# Patient Record
Sex: MALE | Race: WHITE | NOT HISPANIC OR LATINO | Employment: FULL TIME | URBAN - METROPOLITAN AREA
[De-identification: names, ages, dates, MRNs, and addresses within clinical notes are randomized per-mention and may not be internally consistent; named-entity substitution may affect disease eponyms.]

---

## 2024-05-01 ENCOUNTER — OFFICE VISIT (OUTPATIENT)
Dept: FAMILY MEDICINE CLINIC | Facility: CLINIC | Age: 29
End: 2024-05-01
Payer: COMMERCIAL

## 2024-05-01 VITALS
HEIGHT: 72 IN | SYSTOLIC BLOOD PRESSURE: 142 MMHG | WEIGHT: 207 LBS | BODY MASS INDEX: 28.04 KG/M2 | RESPIRATION RATE: 18 BRPM | HEART RATE: 78 BPM | OXYGEN SATURATION: 99 % | DIASTOLIC BLOOD PRESSURE: 90 MMHG

## 2024-05-01 DIAGNOSIS — E10.65 TYPE 1 DIABETES MELLITUS WITH HYPERGLYCEMIA: Primary | ICD-10-CM

## 2024-05-01 DIAGNOSIS — E66.3 OVERWEIGHT WITH BODY MASS INDEX (BMI) OF 28 TO 28.9 IN ADULT: ICD-10-CM

## 2024-05-01 PROCEDURE — 99204 OFFICE O/P NEW MOD 45 MIN: CPT | Performed by: NURSE PRACTITIONER

## 2024-05-01 RX ORDER — LISINOPRIL 10 MG/1
10 TABLET ORAL DAILY
Qty: 90 TABLET | Refills: 3 | Status: SHIPPED | OUTPATIENT
Start: 2024-05-01

## 2024-05-01 NOTE — PROGRESS NOTES
Subjective   Anam Lehman is a 28 y.o. male.     History of Present Illness   New patient here to establish primary care   in [place]    Chronic Type 1 diabetes x  16+ years.   Meds: Novolog 18 units w meals, novolog 40 units nightly. He last saw endo 2-3 years ago. He does not check Bloodsugar. He has not liked the continuous glucose monitor. No meter at home.   Diet is somewhat healthy, 2 meals.day, coffee in AM only, a lot of meals out. Drinks alcohol 8-10 drinks/week, he drinks mostly water, some coke zero. He is lifting weight regularly. Weight is stable around 200 lbs.   Last A1c unknown.   No Neuropathy. Does not inspect feet, no history of wounds.   No recent Eye exam, never foot exam  ACE Lisinopril 10 mg qd. No side effects. BP elevated today 142/90, did not take lisinopril last night. No chest pain, palpitations, vision changes, + headache with elevated BP.   No Statin.    The following portions of the patient's history were reviewed and updated as appropriate: allergies, current medications, past family history, past medical history, past social history, past surgical history and problem list.    Review of Systems   Constitutional:  Negative for activity change, appetite change and unexpected weight loss.   Eyes:  Negative for visual disturbance.   Respiratory:  Negative for cough, shortness of breath and wheezing.    Cardiovascular:  Negative for chest pain, palpitations and leg swelling.   Endocrine: Negative for cold intolerance, heat intolerance, polydipsia, polyphagia and polyuria.   Genitourinary:  Negative for urinary incontinence.   Neurological:  Positive for headache. Negative for weakness.   Psychiatric/Behavioral:  Negative for sleep disturbance and depressed mood. The patient is not nervous/anxious.          Current Outpatient Medications:     insulin aspart (novoLOG) 100 UNIT/ML injection, Inject 18 Units under the skin into the appropriate area as directed 3 (Three) Times a  Day Before Meals., Disp: , Rfl:     insulin glargine (LANTUS) 100 UNIT/ML injection, Inject 40 Units under the skin into the appropriate area as directed Daily., Disp: , Rfl:     lisinopril (PRINIVIL,ZESTRIL) 10 MG tablet, Take 1 tablet by mouth Daily., Disp: 90 tablet, Rfl: 3    Objective   Physical Exam  Vitals reviewed.   Constitutional:       Appearance: Normal appearance.   HENT:      Nose: Nose normal.      Mouth/Throat:      Mouth: Mucous membranes are moist.   Cardiovascular:      Rate and Rhythm: Normal rate and regular rhythm.      Pulses: Normal pulses.      Heart sounds: Normal heart sounds.   Pulmonary:      Effort: Pulmonary effort is normal.      Breath sounds: Normal breath sounds.   Abdominal:      General: Bowel sounds are normal.   Skin:     General: Skin is warm.   Neurological:      General: No focal deficit present.      Mental Status: He is alert.         Vitals:    05/01/24 1058   BP: 142/90   Pulse: 78   Resp: 18   SpO2: 99%     Body mass index is 28.07 kg/m².    Procedures    TSH   Date Value Ref Range Status   05/23/2019 1.183 0.350 - 3.600 mcunit/mL Final     Comment:     Note: Pediatric Reference Ranges based on In-house studies and [employer] database [email or web address]    Pregnant Females:  1st Trimester: 0.02-2.81 mcU/mL  2nd Trimester: 0.19-2.90 mcU/mL  3rd Trimester: 0.30-3.00 mcU/mL     Hemoglobin A1C   Date Value Ref Range Status   10/31/2020 7.40 (H) 4.80 - 5.60 % Final       Assessment & Plan   Problems Addressed this Visit       Diabetes mellitus type 1 - Primary    Relevant Orders    Comprehensive Metabolic Panel    CBC & Differential    Hepatitis C Antibody    Lipid Panel With / Chol / HDL Ratio    Hemoglobin A1c    Microalbumin / Creatinine Urine Ratio - Urine, Clean Catch    TSH    T4, Free    Ambulatory Referral to Endocrinology    Overweight with body mass index (BMI) of 28 to 28.9 in adult     Patient's (Body mass index is 28.07 kg/m².) indicates that they are overweight  with health conditions that include hypertension and diabetes mellitus . Weight is newly identified. BMI is is above average; BMI management plan is completed. We discussed portion control, increasing exercise, joining a fitness center or start home based exercise program, and an leonides-based approach such as [employer] Pal or Lose It.           Diagnoses         Codes Comments    Type 1 diabetes mellitus with hyperglycemia    -  Primary ICD-10-CM: E10.65  ICD-9-CM: 250.01     Overweight with body mass index (BMI) of 28 to 28.9 in adult     ICD-10-CM: E66.3, Z68.28  ICD-9-CM: 278.02, V85.24           Orders Placed This Encounter   Procedures    Comprehensive Metabolic Panel     Order Specific Question:   Release to patient     Answer:   Routine Release [[id number]]    Hepatitis C Antibody     Order Specific Question:   Release to patient     Answer:   Routine Release [[id number]]    Lipid Panel With / Chol / HDL Ratio     Order Specific Question:   Release to patient     Answer:   Routine Release [[id number]]    Hemoglobin A1c     Order Specific Question:   Release to patient     Answer:   Routine Release [[id number]]    Microalbumin / Creatinine Urine Ratio - Urine, Clean Catch     Order Specific Question:   Release to patient     Answer:   Routine Release [[id number]]    TSH     Order Specific Question:   Release to patient     Answer:   Routine Release [[id number]]    T4, Free     Order Specific Question:   Release to patient     Answer:   Routine Release [[id number]]    Ambulatory Referral to Endocrinology     Referral Priority:   Routine     Referral Type:   Consultation     Referral Reason:   Specialty Services Required     Requested Specialty:   Endocrinology     Number of Visits Requested:   1    CBC & Differential     Order Specific Question:   Release to patient     Answer:   Routine Release [[id number]]       Type 1 diabetes- refer endo, declines glucometer, has family friend who has been prescribing insulin,  encouraged diabetic diet and limit alcohol             Education provided in AVS   Return in about 6 months (around 11/1/2024) for Annual physical.

## 2024-05-02 LAB
ALBUMIN SERPL-MCNC: 4.6 G/DL (ref 4.3–5.2)
ALBUMIN/GLOB SERPL: 1.6 {RATIO} (ref 1.2–2.2)
ALP SERPL-CCNC: 52 IU/L (ref 44–121)
ALT SERPL-CCNC: 10 IU/L (ref 0–44)
AST SERPL-CCNC: 17 IU/L (ref 0–40)
BASOPHILS # BLD AUTO: 0 X10E3/UL (ref 0–0.2)
BASOPHILS NFR BLD AUTO: 1 %
BILIRUB SERPL-MCNC: 0.6 MG/DL (ref 0–1.2)
BUN SERPL-MCNC: 16 MG/DL (ref 6–20)
BUN/CREAT SERPL: 16 (ref 9–20)
CALCIUM SERPL-MCNC: 9.9 MG/DL (ref 8.7–10.2)
CHLORIDE SERPL-SCNC: 100 MMOL/L (ref 96–106)
CHOLEST SERPL-MCNC: 157 MG/DL (ref 100–199)
CHOLEST/HDLC SERPL: 2.7 RATIO (ref 0–5)
CO2 SERPL-SCNC: 25 MMOL/L (ref 20–29)
CREAT SERPL-MCNC: 0.98 MG/DL (ref 0.76–1.27)
EGFRCR SERPLBLD CKD-EPI 2021: 109 ML/MIN/1.73
EOSINOPHIL # BLD AUTO: 0.1 X10E3/UL (ref 0–0.4)
EOSINOPHIL NFR BLD AUTO: 1 %
ERYTHROCYTE [DISTWIDTH] IN BLOOD BY AUTOMATED COUNT: 12.1 % (ref 11.6–15.4)
GLOBULIN SER CALC-MCNC: 2.8 G/DL (ref 1.5–4.5)
GLUCOSE SERPL-MCNC: 223 MG/DL (ref 70–99)
HBA1C MFR BLD: 7.4 % (ref 4.8–5.6)
HCT VFR BLD AUTO: 48.9 % (ref 37.5–51)
HCV IGG SERPL QL IA: NON REACTIVE
HDLC SERPL-MCNC: 59 MG/DL
HGB BLD-MCNC: 16.4 G/DL (ref 13–17.7)
IMM GRANULOCYTES # BLD AUTO: 0 X10E3/UL (ref 0–0.1)
IMM GRANULOCYTES NFR BLD AUTO: 0 %
LDLC SERPL CALC-MCNC: 87 MG/DL (ref 0–99)
LYMPHOCYTES # BLD AUTO: 1.1 X10E3/UL (ref 0.7–3.1)
LYMPHOCYTES NFR BLD AUTO: 25 %
MCH RBC QN AUTO: 30.5 PG (ref 26.6–33)
MCHC RBC AUTO-ENTMCNC: 33.5 G/DL (ref 31.5–35.7)
MCV RBC AUTO: 91 FL (ref 79–97)
MONOCYTES # BLD AUTO: 0.4 X10E3/UL (ref 0.1–0.9)
MONOCYTES NFR BLD AUTO: 8 %
NEUTROPHILS # BLD AUTO: 2.8 X10E3/UL (ref 1.4–7)
NEUTROPHILS NFR BLD AUTO: 65 %
PLATELET # BLD AUTO: 298 X10E3/UL (ref 150–450)
POTASSIUM SERPL-SCNC: 4.7 MMOL/L (ref 3.5–5.2)
PROT SERPL-MCNC: 7.4 G/DL (ref 6–8.5)
RBC # BLD AUTO: 5.38 X10E6/UL (ref 4.14–5.8)
SODIUM SERPL-SCNC: 140 MMOL/L (ref 134–144)
T4 FREE SERPL-MCNC: 1.31 NG/DL (ref 0.82–1.77)
TRIGL SERPL-MCNC: 54 MG/DL (ref 0–149)
TSH SERPL DL<=0.005 MIU/L-ACNC: 1.27 UIU/ML (ref 0.45–4.5)
VLDLC SERPL CALC-MCNC: 11 MG/DL (ref 5–40)
WBC # BLD AUTO: 4.4 X10E3/UL (ref 3.4–10.8)

## 2024-05-14 ENCOUNTER — OFFICE VISIT (OUTPATIENT)
Dept: ENDOCRINOLOGY | Age: 29
End: 2024-05-14
Payer: COMMERCIAL

## 2024-05-14 VITALS
SYSTOLIC BLOOD PRESSURE: 118 MMHG | HEART RATE: 81 BPM | OXYGEN SATURATION: 97 % | WEIGHT: 209 LBS | BODY MASS INDEX: 28.31 KG/M2 | DIASTOLIC BLOOD PRESSURE: 72 MMHG | HEIGHT: 72 IN

## 2024-05-14 DIAGNOSIS — R94.6 ABNORMAL THYROID EXAM: ICD-10-CM

## 2024-05-14 DIAGNOSIS — E10.65 TYPE 1 DIABETES MELLITUS WITH HYPERGLYCEMIA: Primary | ICD-10-CM

## 2024-05-14 PROCEDURE — 99244 OFF/OP CNSLTJ NEW/EST MOD 40: CPT | Performed by: STUDENT IN AN ORGANIZED HEALTH CARE EDUCATION/TRAINING PROGRAM

## 2024-05-14 RX ORDER — ACYCLOVIR 400 MG/1
1 TABLET ORAL
Qty: 3 EACH | Refills: 11 | Status: SHIPPED | OUTPATIENT
Start: 2024-05-14

## 2024-05-14 NOTE — PATIENT INSTRUCTIONS
[facility] [place]  [address]  [place] [address]    Today's Date: 5/14/2024    Insulin Dosing Instructions    YOUR INSULIN DOSING HAS BEEN CHANGED TO: Before Breakfast Before Lunch Before Evening Meal Bedtime   Long-Acting Insulins Basaglar (Glargine)  Lantus (Glargine)  Levemir (Detemir)  Semglee (Glargine)  Toujeo (Glargine)  Tresiba (Degludec)    40   Rapid-Acting Insulins Admelog (Lispro)  Apidra (Glulisine)  Flasp (Aspart)  Humalog (Lispro)  Lyumjev (Lispro)  Novolog (Aspart) 18 18 18    NPH Insulin Humulin/Novolin N       Regular Insulin Humulin/Novolin R       Premixed Insulin Humalog/Humulin  Novolog/Novolin  Mix 70/30, 75/25, or 50/50       Sliding Scale for Rapid-acting   or regular insulin based on blood sugar 151-200 + + + +    201-250 + + + +    251-300 + + + +    301-350 + + + +    >350 + + + +     Adjusting your long-acting insulin:    If pre-breakfast sugar is above 120 on 2 consecutive days, then increase your long-acting insulin by 2 units. Max 60 units daily/ Lantus     If pre-breakfast sugar is below 80 on 2 consecutive days, then decrease your long-acting insulin by 2 units.

## 2024-05-14 NOTE — ASSESSMENT & PLAN NOTE
Diabetes is  uncontrolled .   Medication changes per orders.  Reminded to bring in blood sugar diary at next visit.  Recommended an ADA diet.  Recommended a Mediterranean style of eating  Regular aerobic exercise.  Discussed ways to avoid symptomatic hypoglycemia.  Discussed sick day management.  Discussed foot care.  Reminded to get yearly retinal exam.  Instructed to take NovoLog 18 units 15 minutes before each meals.  Take Lantus 40 units daily, if fasting blood sugar above 120 on 2 consecutive days can increase the dose by 2 units max daily dose 60 units daily  If fasting blood sugar less than 80 on 2 consecutive days can decrease the dose by 2 units.  Hypoglycemia signs/symptoms and treatment discussed with patient  Patient was instructed to monitor blood sugar  Prescription for Dexcom G7 sent in  Diabetes will be reassessed in 3 months

## 2024-05-14 NOTE — ADDENDUM NOTE
Addended by: NOKHBEHZAEIM, MAHROKH on: 5/14/2024 12:13 PM     Modules accepted: Level of Service

## 2024-05-14 NOTE — PROGRESS NOTES
"Chief Complaint  Diabetes    Subjective        Anam Lehman presents to [facility] ENDOCRINOLOG[facility] for consult.         History of Present Illness      Referred for further management of type 1 diabetes  Diagnosed with type 1 diabetes at age 12  Diabetes complications: Denies    Current medication: Lantus 40 units daily and NovoLog 18 units with meals: on the current regimen for 10 years     Does not check blood sugars   Last DKA 5 years ago       Paternal aunt with tyep 1 diabetes  Mother has thyroid disease   Former smoker   Not interested in insulin pump       Component      Latest Ref Rng 5/1/2024   Glucose      70 - 99 mg/dL 223 (H)    BUN      6 - 20 mg/dL 16    Creatinine      0.76 - 1.27 mg/dL 0.98    EGFR Result      >59 mL/min/1.73 109    BUN/Creatinine Ratio      9 - 20  16    Sodium      134 - 144 mmol/L 140    Potassium      3.5 - 5.2 mmol/L 4.7    Chloride      96 - 106 mmol/L 100    CO2      20 - 29 mmol/L 25    Calcium      8.7 - 10.2 mg/dL 9.9    Total Protein      6.0 - 8.5 g/dL 7.4    Albumin      4.3 - 5.2 g/dL 4.6    Globulin      1.5 - 4.5 g/dL 2.8    A/G Ratio      1.2 - 2.2  1.6    Total Bilirubin      0.0 - 1.2 mg/dL 0.6    Alkaline Phosphatase      44 - 121 IU/L 52    AST (SGOT)      0 - 40 IU/L 17    ALT (SGPT)      0 - 44 IU/L 10    Total Cholesterol      100 - 199 mg/dL 157    Triglycerides      0 - 149 mg/dL 54    HDL Cholesterol      >39 mg/dL 59    VLDL Cholesterol Reece      5 - 40 mg/dL 11    LDL Cholesterol       0 - 99 mg/dL 87    Chol/HDL Ratio      0.0 - 5.0 ratio 2.7    Hemoglobin A1C      4.8 - 5.6 % 7.4 (H)    TSH Baseline      0.450 - 4.500 uIU/mL 1.270    Free T4      0.82 - 1.77 ng/dL 1.31       Legend:  (H) High    Objective   Vital Signs:  /72 (BP Location: Right arm, Patient Position: Sitting, Cuff Size: Adult)   Pulse 81   Ht 182.9 cm (72.01\")   Wt 94.8 kg (209 lb)   SpO2 97%   BMI 28.34 kg/m²   Estimated body mass index is 28.34 kg/m² as " "calculated from the following:    Height as of this encounter: 182.9 cm (72.01\").    Weight as of this encounter: 94.8 kg (209 lb).               Review of Systems   Constitutional:  Negative for unexpected weight change.   Eyes:  Negative for visual disturbance.   Cardiovascular:  Negative for palpitations.   Gastrointestinal:  Negative for abdominal pain, nausea and vomiting.   Endocrine: Negative for cold intolerance.   Neurological:  Negative for dizziness and light-headedness.        Physical Exam  Constitutional:       Appearance: Normal appearance.   HENT:      Head: Normocephalic and atraumatic.   Eyes:      Extraocular Movements: Extraocular movements intact.   Neck:      Comments: Enlarged thyroid with bilateral nodularity   Cardiovascular:      Rate and Rhythm: Normal rate and regular rhythm.   Pulmonary:      Effort: Pulmonary effort is normal.      Breath sounds: Normal breath sounds. No wheezing.   Abdominal:      Palpations: Abdomen is soft.      Tenderness: There is no abdominal tenderness.   Musculoskeletal:         General: No swelling. Normal range of motion.      Cervical back: No tenderness.   Neurological:      General: No focal deficit present.      Mental Status: He is alert and oriented to person, place, and time. Mental status is at baseline.   Psychiatric:         Mood and Affect: Mood normal.        Result Review :  The following data was reviewed by: Mahrokh Nokhbehzaeim, MD on 05/14/2024:  CMP          5/1/2024    11:18   CMP   Glucose 223    BUN 16    Creatinine 0.98    Sodium 140    Potassium 4.7    Chloride 100    Calcium 9.9    Total Protein 7.4    Albumin 4.6    Globulin 2.8    Total Bilirubin 0.6    Alkaline Phosphatase 52    AST (SGOT) 17    ALT (SGPT) 10    BUN/Creatinine Ratio 16      TSH          5/1/2024    11:18   TSH   TSH 1.270      Most Recent A1C          5/1/2024    11:18   HGBA1C Most Recent   Hemoglobin A1C 7.4      Common labs          5/1/2024    11:18   Common Labs "   Glucose 223    BUN 16    Creatinine 0.98    Sodium 140    Potassium 4.7    Chloride 100    Calcium 9.9    Total Protein 7.4    Albumin 4.6    Total Bilirubin 0.6    Alkaline Phosphatase 52    AST (SGOT) 17    ALT (SGPT) 10    WBC 4.4    Hemoglobin 16.4    Hematocrit 48.9    Platelets 298    Total Cholesterol 157    Triglycerides 54    HDL Cholesterol 59    LDL Cholesterol  87    Hemoglobin A1C 7.4      CMP          5/1/2024    11:18   CMP   Glucose 223    BUN 16    Creatinine 0.98    Sodium 140    Potassium 4.7    Chloride 100    Calcium 9.9    Total Protein 7.4    Albumin 4.6    Globulin 2.8    Total Bilirubin 0.6    Alkaline Phosphatase 52    AST (SGOT) 17    ALT (SGPT) 10    BUN/Creatinine Ratio 16       CBC          5/1/2024    11:18   CBC   WBC 4.4    RBC 5.38    Hemoglobin 16.4    Hematocrit 48.9    MCV 91    MCH 30.5    MCHC 33.5    RDW 12.1    Platelets 298       Lipid Panel          5/1/2024    11:18   Lipid Panel   Total Cholesterol 157    Triglycerides 54    HDL Cholesterol 59    VLDL Cholesterol 11    LDL Cholesterol  87       Most Recent A1C          5/1/2024    11:18   HGBA1C Most Recent   Hemoglobin A1C 7.4          A1C Last 3 Results          5/1/2024    11:18   HGBA1C Last 3 Results   Hemoglobin A1C 7.4          TSH          5/1/2024    11:18   TSH   TSH 1.270       Free T4   Date Value Ref Range Status   05/01/2024 1.31 0.82 - 1.77 ng/dL Final      Data reviewed : Radiologic studies MRI shoulder              Assessment and Plan   Diagnoses and all orders for this visit:    1. Type 1 diabetes mellitus with hyperglycemia (Primary)  Assessment & Plan:  Diabetes is  uncontrolled .   Medication changes per orders.  Reminded to bring in blood sugar diary at next visit.  Recommended an ADA diet.  Recommended a Mediterranean style of eating  Regular aerobic exercise.  Discussed ways to avoid symptomatic hypoglycemia.  Discussed sick day management.  Discussed foot care.  Reminded to get yearly retinal  exam.  Instructed to take NovoLog 18 units 15 minutes before each meals.  Take Lantus 40 units daily, if fasting blood sugar above 120 on 2 consecutive days can increase the dose by 2 units max daily dose 60 units daily  If fasting blood sugar less than 80 on 2 consecutive days can decrease the dose by 2 units.  Hypoglycemia signs/symptoms and treatment discussed with patient  Patient was instructed to monitor blood sugar  Prescription for Dexcom G7 sent in  Diabetes will be reassessed in 3 months    Orders:  -     Continuous Glucose Sensor (Dexcom G7 Sensor) misc; Use 1 each Every 10 (Ten) Days. Use 1 sensor every 10 days  Dispense: 3 each; Refill: 11  -     Hemoglobin A1c; Future  -     Microalbumin / Creatinine Urine Ratio - Urine, Clean Catch; Future  -     Comprehensive Metabolic Panel; Future    2. Abnormal thyroid exam  Assessment & Plan:  will get a thyroid ultrasound    Orders:  -     US Thyroid; Future             Follow Up   Return in about 3 months (around 8/14/2024).  Patient was given instructions and counseling regarding his condition or for health maintenance advice. Please see specific information pulled into the AVS if appropriate.

## 2024-09-12 ENCOUNTER — TELEPHONE (OUTPATIENT)
Dept: ENDOCRINOLOGY | Age: 29
End: 2024-09-12
Payer: COMMERCIAL

## 2024-11-06 ENCOUNTER — OFFICE VISIT (OUTPATIENT)
Dept: FAMILY MEDICINE CLINIC | Facility: CLINIC | Age: 29
End: 2024-11-06
Payer: COMMERCIAL

## 2024-11-06 VITALS
RESPIRATION RATE: 18 BRPM | WEIGHT: 220 LBS | BODY MASS INDEX: 29.8 KG/M2 | HEART RATE: 77 BPM | DIASTOLIC BLOOD PRESSURE: 90 MMHG | OXYGEN SATURATION: 100 % | SYSTOLIC BLOOD PRESSURE: 130 MMHG | HEIGHT: 72 IN

## 2024-11-06 DIAGNOSIS — Z72.0 TOBACCO USE: ICD-10-CM

## 2024-11-06 DIAGNOSIS — Z23 NEED FOR VACCINATION: ICD-10-CM

## 2024-11-06 DIAGNOSIS — Z00.00 ANNUAL PHYSICAL EXAM: Primary | ICD-10-CM

## 2024-11-06 DIAGNOSIS — E10.69 ONYCHOMYCOSIS OF MULTIPLE TOENAILS WITH TYPE 1 DIABETES MELLITUS: ICD-10-CM

## 2024-11-06 DIAGNOSIS — B35.1 ONYCHOMYCOSIS OF MULTIPLE TOENAILS WITH TYPE 1 DIABETES MELLITUS: ICD-10-CM

## 2024-11-06 DIAGNOSIS — I10 PRIMARY HYPERTENSION: ICD-10-CM

## 2024-11-06 DIAGNOSIS — E10.65 TYPE 1 DIABETES MELLITUS WITH HYPERGLYCEMIA: ICD-10-CM

## 2024-11-06 PROBLEM — I15.2 HYPERTENSION ASSOCIATED WITH DIABETES: Status: ACTIVE | Noted: 2018-11-29

## 2024-11-06 PROBLEM — E11.59 HYPERTENSION ASSOCIATED WITH DIABETES: Status: ACTIVE | Noted: 2018-11-29

## 2024-11-06 NOTE — PROGRESS NOTES
Subjective   Anam Lehman is a 28 y.o. male.   Patient here for annual physical exam, labs, chronic illness management and updated screening.      History of Present Illness     The following portions of the patient's history were reviewed and updated as appropriate: allergies, current medications, past family history, past medical history, past social history, past surgical history and problem list.       The patient is a 28-year-old male who presents for a physical exam and vaccines.    He reports overall good health and regular exercise, attending the gym twice a week. However, he expresses concern about recent weight gain. His diet has been suboptimal due to a new job.    Chronic DM1. He is under the care of an endocrinologist for his diabetes, which he has had since childhood. His diabetes management includes nightly Lantus and NovoLog with meals. He does not use a Dexcom device but is considering it.    He does not monitor his blood pressure at home and reports no gastrointestinal issues such as constipation or diarrhea. He also reports no side effects from lisinopril, such as swelling or coughing. He has no history of surgery and has never taken cholesterol medication.    Chronic thick toenails on both feet. He thinks this is fungus. He does not use any over the counter treatments.     He reports no seasonal allergies, changes in skin moles, or numbness and tingling in his feet. He mentions a fungal infection on his left toenail, which is beginning to affect his right foot.    He reports no mental health issues such as depression, anxiety, or thoughts of self-harm. He also reports good sleep quality.    SOCIAL HISTORY  He smokes socially. He drinks alcohol socially.    FAMILY HISTORY  His paternal grandfather had heart attack in his late 60s or early 70s. He was a smoker.       Review of Systems   Constitutional:  Negative for activity change and unexpected weight loss.   HENT:  Negative for congestion.          Dental exam is utd      Eyes:  Negative for visual disturbance.        Dilated eye exam is due      Respiratory:  Negative for cough and shortness of breath.    Cardiovascular:  Negative for chest pain, palpitations and leg swelling.   Gastrointestinal:  Negative for abdominal distention, constipation, diarrhea and GERD.   Endocrine: Negative for cold intolerance and heat intolerance.   Genitourinary:  Negative for frequency and urinary incontinence.   Musculoskeletal:  Negative for arthralgias.   Allergic/Immunologic: Negative for environmental allergies.   Neurological:  Negative for weakness and headache.   Hematological:  Does not bruise/bleed easily.   Psychiatric/Behavioral:  Negative for sleep disturbance and depressed mood.          Current Outpatient Medications:     Continuous Glucose Sensor (Dexcom G7 Sensor) misc, Use 1 each Every 10 (Ten) Days. Use 1 sensor every 10 days, Disp: 3 each, Rfl: 11    insulin aspart (novoLOG) 100 UNIT/ML injection, Inject 18 Units under the skin into the appropriate area as directed 3 (Three) Times a Day Before Meals., Disp: , Rfl:     insulin glargine (LANTUS) 100 UNIT/ML injection, Inject 40 Units under the skin into the appropriate area as directed Daily., Disp: , Rfl:     lisinopril (PRINIVIL,ZESTRIL) 10 MG tablet, Take 1 tablet by mouth Daily., Disp: 90 tablet, Rfl: 3    Objective   Physical Exam  Vitals reviewed.   Constitutional:       Appearance: Normal appearance. He is normal weight.   HENT:      Head: Normocephalic.      Right Ear: Tympanic membrane normal.      Left Ear: Tympanic membrane normal.      Nose: Nose normal.      Mouth/Throat:      Mouth: Mucous membranes are moist.   Eyes:      Pupils: Pupils are equal, round, and reactive to light.   Cardiovascular:      Rate and Rhythm: Normal rate and regular rhythm.      Pulses: Normal pulses.           Dorsalis pedis pulses are 2+ on the right side and 2+ on the left side.        Posterior tibial pulses are  2+ on the right side and 2+ on the left side.      Heart sounds: Normal heart sounds.   Pulmonary:      Effort: Pulmonary effort is normal.      Breath sounds: Normal breath sounds.   Abdominal:      General: Abdomen is flat. Bowel sounds are normal.      Palpations: Abdomen is soft.   Musculoskeletal:         General: Normal range of motion.      Cervical back: Normal range of motion.   Feet:      Right foot:      Protective Sensation: 4 sites tested.  4 sites sensed.      Skin integrity: Callus and dry skin present.      Toenail Condition: Fungal disease present.     Left foot:      Protective Sensation: 4 sites tested.  4 sites sensed.      Skin integrity: Callus and dry skin present.      Toenail Condition: Fungal disease present.  Skin:     General: Skin is warm.      Comments: Toenail fungus    Neurological:      General: No focal deficit present.      Mental Status: He is alert.   Psychiatric:         Mood and Affect: Mood normal.         Vitals:    11/06/24 1109   BP: 130/90   Pulse: 77   Resp: 18   SpO2: 100%     Body mass index is 29.84 kg/m².    Procedures    TSH   Date Value Ref Range Status   05/01/2024 1.270 0.450 - 4.500 uIU/mL Final     Hemoglobin A1C   Date Value Ref Range Status   05/01/2024 7.4 (H) 4.8 - 5.6 % Final     Comment:              Prediabetes: 5.7 - 6.4           Diabetes: >6.4           Glycemic control for adults with diabetes: <7.0                     Assessment & Plan   Problems Addressed this Visit       Type 1 diabetes mellitus    Relevant Orders    Hemoglobin A1c    Microalbumin / Creatinine Urine Ratio - Urine, Clean Catch     Other Visit Diagnoses       Annual physical exam    -  Primary    Relevant Orders    Comprehensive Metabolic Panel    Lipid Panel With / Chol / HDL Ratio    Need for vaccination        Relevant Orders    Fluzone >6mos (Completed)    COVID-19 (Pfizer) 12yrs+ (COMIRNATY) (Completed)    Primary hypertension        Relevant Orders    Comprehensive Metabolic  Panel    Lipid Panel With / Chol / HDL Ratio    Tobacco use        Onychomycosis of multiple toenails with type 1 diabetes mellitus              Diagnoses         Codes Comments    Annual physical exam    -  Primary ICD-10-CM: Z00.00  ICD-9-CM: V70.0     Need for vaccination     ICD-10-CM: Z23  ICD-9-CM: V05.9     Primary hypertension     ICD-10-CM: I10  ICD-9-CM: 401.9     Tobacco use     ICD-10-CM: Z72.0  ICD-9-CM: 305.1     Type 1 diabetes mellitus with hyperglycemia     ICD-10-CM: E10.65  ICD-9-CM: 250.01     Onychomycosis of multiple toenails with type 1 diabetes mellitus     ICD-10-CM: E10.69, B35.1  ICD-9-CM: 250.81, 110.1             Assessment & Plan  1. Type 1 Diabetes Mellitus.  He is currently on Lantus 40 units nightly and NovoLog 18 units with meals on a sliding scale. His last DKA episode was 5 years ago. He has declined both an insulin pump and the Dexcom continuous glucose monitor. His last A1c in May was 7.4. He agreed to have labs done today, including CMP, lipid panel, A1c, and microalbumin. He was advised to maintain a high fiber diet, including plenty of fruits, vegetables, and whole grains. Regular exercise, specifically walking for 150 minutes per week, was recommended. He was also advised to follow up with his endocrinologist.    2. Hypertension.  His blood pressure today was 130/90. He is currently on lisinopril, which helps protect his kidneys and manage his blood pressure. He was advised to monitor his blood pressure regularly at home.    3. Onychomycosis.  He reported a fungal infection on his toenails, primarily on his left foot and starting on his right foot. If his liver function tests are normal, he will be prescribed terbinafine, an oral medication taken once a day for 3 months. If his liver function is not normal, he was advised to use tea tree oil and Vicks VapoRub, along with an antifungal foot spray. He was also advised to keep his feet dry by changing socks frequently.  Add  terbinafine 250 qd x 3 months     4. Health Maintenance.  He agreed to receive the influenza and COVID-19 vaccines today. He was encouraged to have a diabetic eye exam and a diabetic foot exam. He was also advised to see a dentist twice a year and to undergo colon cancer screening at age 45 and prostate cancer screening at age 50.        Preventative care- Follow heart healthy diet, drink water, walk daily. Wear seatbelts, wear helmets, wear sunscreens. Follow CDC guidelines for covid and flu .          Education provided in AVS   No follow-ups on file.    Patient or patient representative verbalized consent for the use of Ambient Listening during the visit with  CORRINE Ying for chart documentation. 11/7/2024  17:29 EST

## 2024-11-07 LAB
ALBUMIN SERPL-MCNC: 4.5 G/DL (ref 3.5–5.2)
ALBUMIN/CREAT UR: <24 MG/G CREAT (ref 0–29)
ALBUMIN/GLOB SERPL: 1.6 G/DL
ALP SERPL-CCNC: 47 U/L (ref 39–117)
ALT SERPL-CCNC: 13 U/L (ref 1–41)
AST SERPL-CCNC: 22 U/L (ref 1–40)
BILIRUB SERPL-MCNC: 0.7 MG/DL (ref 0–1.2)
BUN SERPL-MCNC: 14 MG/DL (ref 6–20)
BUN/CREAT SERPL: 15.9 (ref 7–25)
CALCIUM SERPL-MCNC: 9.6 MG/DL (ref 8.6–10.5)
CHLORIDE SERPL-SCNC: 99 MMOL/L (ref 98–107)
CHOLEST SERPL-MCNC: 161 MG/DL (ref 0–200)
CHOLEST/HDLC SERPL: 2.78 {RATIO}
CO2 SERPL-SCNC: 28.9 MMOL/L (ref 22–29)
CREAT SERPL-MCNC: 0.88 MG/DL (ref 0.76–1.27)
CREAT UR-MCNC: 12.7 MG/DL
EGFRCR SERPLBLD CKD-EPI 2021: 121.6 ML/MIN/1.73
GLOBULIN SER CALC-MCNC: 2.8 GM/DL
GLUCOSE SERPL-MCNC: 88 MG/DL (ref 65–99)
HBA1C MFR BLD: 7.4 % (ref 4.8–5.6)
HDLC SERPL-MCNC: 58 MG/DL (ref 40–60)
LDLC SERPL CALC-MCNC: 91 MG/DL (ref 0–100)
MICROALBUMIN UR-MCNC: <3 UG/ML
POTASSIUM SERPL-SCNC: 4.4 MMOL/L (ref 3.5–5.2)
PROT SERPL-MCNC: 7.3 G/DL (ref 6–8.5)
SODIUM SERPL-SCNC: 138 MMOL/L (ref 136–145)
TRIGL SERPL-MCNC: 57 MG/DL (ref 0–150)
VLDLC SERPL CALC-MCNC: 12 MG/DL (ref 5–40)

## 2024-11-07 RX ORDER — TERBINAFINE HYDROCHLORIDE 250 MG/1
250 TABLET ORAL DAILY
Qty: 84 TABLET | Refills: 0 | Status: SHIPPED | OUTPATIENT
Start: 2024-11-07 | End: 2025-01-30

## 2025-05-14 ENCOUNTER — RESULTS FOLLOW-UP (OUTPATIENT)
Dept: ENDOCRINOLOGY | Age: 30
End: 2025-05-14
Payer: COMMERCIAL

## 2025-06-04 RX ORDER — LISINOPRIL 10 MG/1
10 TABLET ORAL DAILY
Qty: 90 TABLET | Refills: 3 | Status: SHIPPED | OUTPATIENT
Start: 2025-06-04

## 2025-07-10 ENCOUNTER — TELEPHONE (OUTPATIENT)
Dept: FAMILY MEDICINE CLINIC | Facility: CLINIC | Age: 30
End: 2025-07-10

## 2025-07-10 DIAGNOSIS — E10.65 TYPE 1 DIABETES MELLITUS WITH HYPERGLYCEMIA: Primary | ICD-10-CM

## 2025-07-10 RX ORDER — INSULIN ASPART 100 [IU]/ML
18 INJECTION, SOLUTION INTRAVENOUS; SUBCUTANEOUS
Qty: 3 ML | Refills: 3 | Status: SHIPPED | OUTPATIENT
Start: 2025-07-10